# Patient Record
Sex: FEMALE | Race: WHITE | NOT HISPANIC OR LATINO | Employment: FULL TIME | URBAN - METROPOLITAN AREA
[De-identification: names, ages, dates, MRNs, and addresses within clinical notes are randomized per-mention and may not be internally consistent; named-entity substitution may affect disease eponyms.]

---

## 2022-04-27 ENCOUNTER — HOSPITAL ENCOUNTER (EMERGENCY)
Facility: CLINIC | Age: 42
Discharge: HOME | End: 2022-04-27
Admitting: PHYSICIAN ASSISTANT
Payer: COMMERCIAL

## 2022-04-27 VITALS
TEMPERATURE: 98.7 F | HEART RATE: 99 BPM | RESPIRATION RATE: 20 BRPM | WEIGHT: 293 LBS | DIASTOLIC BLOOD PRESSURE: 90 MMHG | OXYGEN SATURATION: 98 % | SYSTOLIC BLOOD PRESSURE: 171 MMHG

## 2022-04-27 DIAGNOSIS — S61.011A LACERATION OF RIGHT THUMB WITHOUT FOREIGN BODY WITHOUT DAMAGE TO NAIL, INITIAL ENCOUNTER: ICD-10-CM

## 2022-04-27 PROCEDURE — 272N000047 HC ADHESIVE DERMABOND SKIN

## 2022-04-27 PROCEDURE — 99283 EMERGENCY DEPT VISIT LOW MDM: CPT

## 2022-04-27 PROCEDURE — 12001 RPR S/N/AX/GEN/TRNK 2.5CM/<: CPT

## 2022-04-27 ASSESSMENT — ENCOUNTER SYMPTOMS
WEAKNESS: 0
NUMBNESS: 0

## 2022-04-27 NOTE — ED TRIAGE NOTES
Patient was cooking dinner and cutting cabbage and slipped causing her to cut her right thumb.  Patient states she cut a small chunk out of the end of the thumb. Patient rates her pain a 4/10. Last Tetanus with the last 2 months.      Triage Assessment     Row Name 04/27/22 0706       Triage Assessment (Adult)    Airway WDL WDL       Respiratory WDL    Respiratory WDL WDL       Skin Circulation/Temperature WDL    Skin Circulation/Temperature WDL WDL       Cardiac WDL    Cardiac WDL WDL       Peripheral/Neurovascular WDL    Peripheral Neurovascular WDL WDL       Cognitive/Neuro/Behavioral WDL    Cognitive/Neuro/Behavioral WDL WDL

## 2022-04-28 NOTE — ED PROVIDER NOTES
History     Chief Complaint:  Laceration     41 y/o LHD female presents for evaluation of right thumb avulsion wound to tip of thumb that occurred 1-2 hours prior while at home. Cut with knife as she was cutting cabbage for dinner.  Wound continued to bleed.  Went to [facility] [place] but referred here for higher level of care.     Denies N/T/W to thumb    TET within past 2 months  HTN Hx  Denies Hx of DM  Not anticoagualated    Lives in [place]  PCP established  SO at        The history is provided by the patient, medical records and the spouse. No  was used.      ROS:  Review of Systems   Skin:        Wound to right thumb tip    Neurological: Negative for weakness and numbness.     Allergies:  Contrast Dye     Medications:    No current outpatient medications on file.    Past Medical History:    No past medical history on file.  There is no problem list on file for this patient.     Past Surgical History:    No past surgical history on file.     Family History:    family history is not on file.    Social History:     PCP: No primary care provider on file.     Physical Exam   Patient Vitals for the past 24 hrs:   BP Temp Temp src Pulse Resp SpO2 Weight   04/27/22 1852 (!) 198/100 -- -- -- -- -- --   04/27/22 1849 -- 98.7  F (37.1  C) Oral 100 20 98 % (!) 176.9 kg (390 lb)      Physical Exam  Vitals and nursing note reviewed.   Constitutional:       General: She is not in acute distress.     Appearance: Normal appearance. She is not ill-appearing, toxic-appearing or diaphoretic.   HENT:      Head: Normocephalic.      Right Ear: External ear normal.      Left Ear: External ear normal.      Mouth/Throat:      Comments: Mask in place. Clear speech.   Eyes:      Conjunctiva/sclera: Conjunctivae normal.   Pulmonary:      Effort: Pulmonary effort is normal.   Musculoskeletal:         General: Normal range of motion.      Cervical back: Normal range of motion.      Comments: Right thumb: <1cm in diameter  superficial avulsion wound to thumb pad.  Bleeding.  TTP.  No bone exposed. Nail not involved.  Full ROM of thumb with ease.  Brisk cap refill to thumb pad and sensation grossly intact to light touch.    Skin:     General: Skin is warm.      Capillary Refill: Capillary refill takes less than 2 seconds.   Neurological:      General: No focal deficit present.      Mental Status: She is alert.   Psychiatric:         Mood and Affect: Mood normal.         Behavior: Behavior normal.         Thought Content: Thought content normal.         Judgment: Judgment normal.           Emergency Department Course     Procedures     Narrative: Procedure: Laceration Repair        LACERATION:  A simple clean <1cm in diameter superficial avulsion wound.      LOCATION:  Right thumb tip pad      FUNCTION:  Distally sensation, circulation, motor and tendon function are intact.      ANESTHESIA:  Soaked in lido 1% with epi solution     PREPARATION:  Irrigation with Normal Saline      DEBRIDEMENT:  no debridement and wound explored, no foreign body found      CLOSURE:  Wound was closed with Dermabond    Emergency Department Course:     Reviewed:  I reviewed nursing notes, vitals and past medical history    Assessments:  : I obtained history and examined the patient as noted above.   : Finger soaked in lido  : wound repair. Discussed dispo plan.     Disposition:  The patient was discharged to home.     Impression & Plan      Medical Decision Makin y/o LHD female presents for evaluation of right thumb avulsion wound to tip of thumb that occurred 1-2 hours prior while at home. Cut with knife as she was cutting cabbage for dinner.  Wound continued to bleed.  Went to [facility] but referred here for higher level of care.     On exam, not distressed.  Vitals noted for HTN, Hx of, and notes BP always high with not-vadim cuff (obese pt, was checked on wrist).  She has no CP/dyspnea/dizziness.     Thumb avulsion as above.  This was glued  as above. No concern for underlying Fx, tendon injury or retained FB.  Nail not involved. Pt and SO at BS educated on S/S that should prompt ED re-eval.  Questions answered. Verbalized understanding. Comfortable with plan and appreciative.     Diagnosis:    ICD-10-CM    1. Laceration of right thumb without foreign body without damage to nail, initial encounter  S61.011A       4/27/2022   Mariama Rodrigues MS, TOM  [facility]          Mariama Rodrigues PA-C  04/27/22 2051

## 2022-04-28 NOTE — ED NOTES
Patient advised that her blood pressure is always increased while at the hospital, she closely monitors it at home and advised it is normally in the 130's/70's.

## (undated) RX ORDER — LIDOCAINE HYDROCHLORIDE AND EPINEPHRINE 10; 10 MG/ML; UG/ML
INJECTION, SOLUTION INFILTRATION; PERINEURAL
Status: DISPENSED
Start: 2022-04-27